# Patient Record
Sex: MALE | Race: WHITE | Employment: STUDENT | URBAN - METROPOLITAN AREA
[De-identification: names, ages, dates, MRNs, and addresses within clinical notes are randomized per-mention and may not be internally consistent; named-entity substitution may affect disease eponyms.]

---

## 2017-06-02 ENCOUNTER — APPOINTMENT (OUTPATIENT)
Dept: OTHER | Facility: HOSPITAL | Age: 22
End: 2017-06-02
Attending: EMERGENCY MEDICINE

## 2017-06-19 ENCOUNTER — OFFICE VISIT (OUTPATIENT)
Dept: OPHTHALMOLOGY | Facility: CLINIC | Age: 22
End: 2017-06-19

## 2017-06-19 DIAGNOSIS — Z86.69 HISTORY OF AMBLYOPIA: ICD-10-CM

## 2017-06-19 DIAGNOSIS — H52.13 MYOPIA OF BOTH EYES WITH ASTIGMATISM: ICD-10-CM

## 2017-06-19 DIAGNOSIS — H52.203 MYOPIA OF BOTH EYES WITH ASTIGMATISM: ICD-10-CM

## 2017-06-19 DIAGNOSIS — H50.00 ESOTROPIA: Primary | ICD-10-CM

## 2017-06-19 PROCEDURE — 92015 DETERMINE REFRACTIVE STATE: CPT | Performed by: OPHTHALMOLOGY

## 2017-06-19 PROCEDURE — 92014 COMPRE OPH EXAM EST PT 1/>: CPT | Performed by: OPHTHALMOLOGY

## 2017-06-19 NOTE — PROGRESS NOTES
Elbert Nelson is a 22year old male. HPI:     HPI     EP/ 22 yr old here for a complete exam. LDE 5/23/16 with Hx of treated amblyopia OD,  myopia and mild esotropia. Pt denies any blurred vision and is happy with his glasses and contacts.         Last mat Clear, good fit on CLS    Anterior Chamber Deep and quiet Deep and quiet    Iris Normal Normal    Lens Clear Clear    Vitreous Clear Clear      Fundus Exam      Right Left    Disc Normal Normal    C/D Ratio 0.3 0.3    Macula Normal Normal    Vessels Normal

## 2017-06-19 NOTE — PATIENT INSTRUCTIONS
Esotropia  Mild; no treatment. History of amblyopia- right eye  Treated; no further treatment is needed. Myopia of both eyes with astigmatism  New contacts and glasses to update as needed.

## 2018-05-25 ENCOUNTER — OFFICE VISIT (OUTPATIENT)
Dept: OPHTHALMOLOGY | Facility: CLINIC | Age: 23
End: 2018-05-25

## 2018-05-25 DIAGNOSIS — H52.13 MYOPIA OF BOTH EYES WITH ASTIGMATISM: ICD-10-CM

## 2018-05-25 DIAGNOSIS — H52.203 MYOPIA OF BOTH EYES WITH ASTIGMATISM: ICD-10-CM

## 2018-05-25 DIAGNOSIS — H50.00 ESOTROPIA: Primary | ICD-10-CM

## 2018-05-25 DIAGNOSIS — Z86.69 HISTORY OF AMBLYOPIA: ICD-10-CM

## 2018-05-25 PROCEDURE — 92014 COMPRE OPH EXAM EST PT 1/>: CPT | Performed by: OPHTHALMOLOGY

## 2018-05-25 PROCEDURE — 92015 DETERMINE REFRACTIVE STATE: CPT | Performed by: OPHTHALMOLOGY

## 2018-05-25 NOTE — PROGRESS NOTES
Linda Acuña is a 25year old male. HPI:     HPI     EP. 24 yo M here for complete. LDE: 6/19/17  Patient has history of esotropia, amblyopia OD (treated), myopia with astigmatism OU. Patient is wearing contacts.   Patient states his vision in his l External Exam       Right Left    External Normal Normal          Slit Lamp Exam       Right Left    Lids/Lashes Normal Normal    Conjunctiva/Sclera Normal Normal    Cornea Clear, good fit on CLS Clear, good fit on CLS    Anterior Chamber Deep and quiet De MD

## 2018-05-25 NOTE — PATIENT INSTRUCTIONS
Esotropia  Small angle, stable    Myopia of both eyes with astigmatism  New glasses and contacts    History of amblyopia- right eye  Treated, doing well

## 2018-06-06 PROCEDURE — 81001 URINALYSIS AUTO W/SCOPE: CPT | Performed by: INTERNAL MEDICINE

## 2018-06-12 PROCEDURE — 81003 URINALYSIS AUTO W/O SCOPE: CPT | Performed by: INTERNAL MEDICINE

## 2019-06-20 PROBLEM — N30.00 ACUTE CYSTITIS: Status: ACTIVE | Noted: 2019-06-20

## 2019-06-20 PROCEDURE — 87086 URINE CULTURE/COLONY COUNT: CPT | Performed by: INTERNAL MEDICINE

## 2019-06-20 PROCEDURE — 87591 N.GONORRHOEAE DNA AMP PROB: CPT | Performed by: INTERNAL MEDICINE

## 2019-06-20 PROCEDURE — 87491 CHLMYD TRACH DNA AMP PROBE: CPT | Performed by: INTERNAL MEDICINE

## 2019-06-20 PROCEDURE — 81001 URINALYSIS AUTO W/SCOPE: CPT | Performed by: INTERNAL MEDICINE

## 2019-06-21 ENCOUNTER — OFFICE VISIT (OUTPATIENT)
Dept: OPHTHALMOLOGY | Facility: CLINIC | Age: 24
End: 2019-06-21
Payer: COMMERCIAL

## 2019-06-21 DIAGNOSIS — H50.00 ESOTROPIA: Primary | ICD-10-CM

## 2019-06-21 DIAGNOSIS — Z86.69 HISTORY OF AMBLYOPIA: ICD-10-CM

## 2019-06-21 DIAGNOSIS — H52.203 MYOPIA OF BOTH EYES WITH ASTIGMATISM: ICD-10-CM

## 2019-06-21 DIAGNOSIS — H52.13 MYOPIA OF BOTH EYES WITH ASTIGMATISM: ICD-10-CM

## 2019-06-21 PROCEDURE — 92014 COMPRE OPH EXAM EST PT 1/>: CPT | Performed by: OPHTHALMOLOGY

## 2019-06-21 PROCEDURE — 92015 DETERMINE REFRACTIVE STATE: CPT | Performed by: OPHTHALMOLOGY

## 2019-06-21 NOTE — PATIENT INSTRUCTIONS
History of amblyopia- right eye  treated    Myopia of both eyes with astigmatism  New Rx    Esotropia  Small Esotropia

## 2019-06-22 NOTE — PROGRESS NOTES
Yusuf Saldaña is a 26year old male. HPI:     HPI     EP/ 26year old here for complete exam. LDE 5/25/18 with history of esotropia, amblyopia OD (treated), myopia with astigmatism OU. Patient is wearing contacts.   Patient states his vision in his left Distance Near Near +3DS N Bifocals    RET 6 RET' 8               Slit Lamp and Fundus Exam     External Exam       Right Left    External Normal Normal          Slit Lamp Exam       Right Left    Lids/Lashes Normal Normal    Conjunctiva/Sclera Normal Stacy Seton encounter        Follow up instructions:  Return in about 1 year (around 6/21/2020) for Dilated exam, Refraction. 6/21/2019  Scribed by: Elgin Webb.  Khalida Murillo MD

## 2020-06-05 ENCOUNTER — OFFICE VISIT (OUTPATIENT)
Dept: OPHTHALMOLOGY | Facility: CLINIC | Age: 25
End: 2020-06-05
Payer: COMMERCIAL

## 2020-06-05 DIAGNOSIS — H50.00 ESOTROPIA: Primary | ICD-10-CM

## 2020-06-05 DIAGNOSIS — H52.13 MYOPIA OF BOTH EYES WITH ASTIGMATISM: ICD-10-CM

## 2020-06-05 DIAGNOSIS — H52.203 MYOPIA OF BOTH EYES WITH ASTIGMATISM: ICD-10-CM

## 2020-06-05 DIAGNOSIS — Z86.69 HISTORY OF AMBLYOPIA: ICD-10-CM

## 2020-06-05 PROCEDURE — 92060 SENSORIMOTOR EXAMINATION: CPT | Performed by: OPHTHALMOLOGY

## 2020-06-05 PROCEDURE — 92014 COMPRE OPH EXAM EST PT 1/>: CPT | Performed by: OPHTHALMOLOGY

## 2020-06-05 PROCEDURE — 92015 DETERMINE REFRACTIVE STATE: CPT | Performed by: OPHTHALMOLOGY

## 2020-06-05 RX ORDER — IBUPROFEN 200 MG
200 TABLET ORAL
COMMUNITY

## 2020-06-05 RX ORDER — MULTIVITAMIN,THER AND MINERALS
1 TABLET ORAL
COMMUNITY

## 2020-06-05 NOTE — PATIENT INSTRUCTIONS
History of amblyopia- right eye  Treated    Esotropia  Small angle right esotropia    Myopia of both eyes with astigmatism  Same contacts. New glasses for distance. Wear old ones for computer work.

## 2020-06-05 NOTE — PROGRESS NOTES
Helena Steward is a 22year old male. HPI:     HPI     EP/ here for a complete exam. LDE 6/21/19 with hx of treated amblyopia OD, myopia OU, astigmatism and esotropia. Pt denies any blurred vision OU at distance or near with his glasses or contacts.  Pt get Tonometry (Applanation, 11:30 AM)       Right Left    Pressure 15 15          Pupils       Pupils APD    Right PERRL None    Left PERRL None          Visual Fields (Counting fingers)       Left Right     Full Full          Extraocular Movement       Right ASSESSMENT/PLAN:     Diagnoses and Plan:     History of amblyopia- right eye  Treated    Esotropia  Small angle right esotropia    Myopia of both eyes with astigmatism  Same contacts. New glasses for distance. Wear old ones for computer work.       No ord

## 2021-11-29 ENCOUNTER — TELEPHONE (OUTPATIENT)
Dept: OPHTHALMOLOGY | Facility: CLINIC | Age: 26
End: 2021-11-29

## 2021-11-29 NOTE — TELEPHONE ENCOUNTER
Patients mother states office called to cancel patients appointment scheduled for 12/27/21. Mother states it is very important to not miss this appointment due to patients history.  Patient is out of town for school in [place] and will be coming home for end of

## 2021-12-20 ENCOUNTER — OFFICE VISIT (OUTPATIENT)
Dept: OPHTHALMOLOGY | Facility: CLINIC | Age: 26
End: 2021-12-20
Payer: COMMERCIAL

## 2021-12-20 DIAGNOSIS — H50.00 ESOTROPIA: Primary | ICD-10-CM

## 2021-12-20 DIAGNOSIS — H52.203 MYOPIA OF BOTH EYES WITH ASTIGMATISM: ICD-10-CM

## 2021-12-20 DIAGNOSIS — Z86.69 HISTORY OF AMBLYOPIA: ICD-10-CM

## 2021-12-20 DIAGNOSIS — H52.13 MYOPIA OF BOTH EYES WITH ASTIGMATISM: ICD-10-CM

## 2021-12-20 PROCEDURE — 92014 COMPRE OPH EXAM EST PT 1/>: CPT | Performed by: OPHTHALMOLOGY

## 2021-12-20 PROCEDURE — 92015 DETERMINE REFRACTIVE STATE: CPT | Performed by: OPHTHALMOLOGY

## 2021-12-20 PROCEDURE — 92060 SENSORIMOTOR EXAMINATION: CPT | Performed by: OPHTHALMOLOGY

## 2021-12-20 NOTE — PATIENT INSTRUCTIONS
Myopia of both eyes with astigmatism  Same contacts. New glasses for distance. Wear old ones for computer work.     Esotropia  Small angle right esotropia    History of amblyopia- right eye  Treated

## 2021-12-20 NOTE — PROGRESS NOTES
Helena Steward is a 23year old male.     HPI:     HPI     EP/ 23year old M here for a complete eye exam. LDE: 6/5/2020 with a history of myopia in both eyes with astigmatism in the right eye, esotropia (mild) and a history of amblyopia in the right eye (roger Contacts          Tonometry (Applanation, 2:25 PM)       Right Left    Pressure 16 16          Pupils       Pupils APD    Right PERRL None    Left PERRL None          Visual Fields (Counting fingers)       Left Right     Full Full          Extraocular Move Moist 8.50 14.2 -6.50    Expiration Date: 12/21/2023                 ASSESSMENT/PLAN:     Diagnoses and Plan:     Myopia of both eyes with astigmatism  Same contacts. New glasses for distance. Wear old ones for computer work.     Esotropia  Small angle righ

## 2022-12-19 ENCOUNTER — OFFICE VISIT (OUTPATIENT)
Dept: OPHTHALMOLOGY | Facility: CLINIC | Age: 27
End: 2022-12-19
Payer: COMMERCIAL

## 2022-12-19 DIAGNOSIS — Z86.69 HISTORY OF AMBLYOPIA: ICD-10-CM

## 2022-12-19 DIAGNOSIS — H50.00 ESOTROPIA: Primary | ICD-10-CM

## 2022-12-19 DIAGNOSIS — H52.13 MYOPIA OF BOTH EYES WITH ASTIGMATISM: ICD-10-CM

## 2022-12-19 DIAGNOSIS — H52.203 MYOPIA OF BOTH EYES WITH ASTIGMATISM: ICD-10-CM

## 2022-12-19 NOTE — PATIENT INSTRUCTIONS
Esotropia  Small angle right esotropia    History of amblyopia- right eye  Treated    Myopia of both eyes with astigmatism  Same contacts. New glasses for distance. Wear old ones for computer work.

## 2022-12-23 ENCOUNTER — TELEPHONE (OUTPATIENT)
Dept: OPHTHALMOLOGY | Facility: CLINIC | Age: 27
End: 2022-12-23

## 2022-12-23 NOTE — TELEPHONE ENCOUNTER
Pt asking for 2020 Rx for close work. Him and Dr. Grecia Ha discussed at his last exam that he can use his OLD Glasses Rx from 2020 for near/ close up computer work. He is asking for copy of 2020 Rx to get them made with blue light coding.

## 2024-05-20 ENCOUNTER — TELEPHONE (OUTPATIENT)
Dept: OPHTHALMOLOGY | Facility: CLINIC | Age: 29
End: 2024-05-20

## 2024-05-20 NOTE — TELEPHONE ENCOUNTER
Patient mother calling to request an appointment for 07.01.25 as patient lives in [place] and will only be in town for that week and patients originally scheduled appointment for 07.05.24 needs to be rescheduled. During this time will be only patients only time to be able to be seen as again [place] out of state. Please advise.

## 2024-05-20 NOTE — TELEPHONE ENCOUNTER
I spoke to patient's mother and informed her that Dr. Paul is not in clinic on 7/01/24.  Mother understands.  She states patient might find an ophthalmologist in [place] or call if he is home for an upcoming holiday.

## (undated) NOTE — MR AVS SNAPSHOT
[facility]  [address]  [phone]               Thank you for choosing us for your health care visit with Juan Jose.  Christina Greene MD.  We are glad to serve you and happy to provide you with this pierre Your unique [employer] Access Code: [email or web address]  Expires: 8/18/2017  9:46 AM    If you have questions, you can call [phone] to talk to our [facility] Staff. Remember, [employer] is NOT to be used for urgent needs. For medical emergencies, dial 911.